# Patient Record
Sex: FEMALE | Race: WHITE | NOT HISPANIC OR LATINO
[De-identification: names, ages, dates, MRNs, and addresses within clinical notes are randomized per-mention and may not be internally consistent; named-entity substitution may affect disease eponyms.]

---

## 2019-09-27 ENCOUNTER — MEDICATION RENEWAL (OUTPATIENT)
Age: 64
End: 2019-09-27

## 2019-09-27 DIAGNOSIS — I10 ESSENTIAL (PRIMARY) HYPERTENSION: ICD-10-CM

## 2020-04-28 ENCOUNTER — EMERGENCY (EMERGENCY)
Facility: HOSPITAL | Age: 65
LOS: 1 days | Discharge: HOME | End: 2020-04-28
Attending: EMERGENCY MEDICINE | Admitting: EMERGENCY MEDICINE
Payer: MEDICARE

## 2020-04-28 VITALS
OXYGEN SATURATION: 93 % | SYSTOLIC BLOOD PRESSURE: 104 MMHG | TEMPERATURE: 100 F | DIASTOLIC BLOOD PRESSURE: 71 MMHG | WEIGHT: 199.96 LBS | RESPIRATION RATE: 18 BRPM | HEART RATE: 84 BPM

## 2020-04-28 PROCEDURE — 99283 EMERGENCY DEPT VISIT LOW MDM: CPT

## 2020-04-28 NOTE — ED PROVIDER NOTE - NSFOLLOWUPINSTRUCTIONS_ED_ALL_ED_FT
YOU WERE SEEN IN THE ED FOR A LIKELY VIRAL ILLNESS. WE CANNOT PERFORM DEFINITIVE TESTING AT THIS TIME FOR THE NOVEL CORONAVIRUS, HOWEVER IT IS VERY LIKELY THAT YOU HAVE COVID-19 AND TESTING IS NOT NECESSARY AT THIS TIME. PLEASE READ THE INFORMATION PACKET PROVIDED TO YOU CAREFULLY.     YOU SHOULD SELF-QUARANTINE FOR 14 DAYS TO AVOID POTENTIAL SPREAD OF THE CORONAVIRUS.     PLEASE CONTACT YOUR REGULAR DOCTOR OR CALL  [phone] after your symptoms are gone for 7 days to see if you can be tested to be sure you no longer have the coronavirus infection and when you can return to normal activity.    Return to the ED for difficulty breathing especially when you are at rest as this is a sign of severe pneumonia from the virus.    You may over the counter acetaminophen (Tylenol) 650mg every 6 hours as needed for fever or pain.     Do NOT exceed 3500mg acetaminophen in 24 hours.  Please do not take these medications if you do not have pain or fever or if you have any history of liver disease.    -------------    What is a coronavirus?  Coronaviruses are a large family of viruses that cause illnesses ranging from the common cold to more severe diseases such as Middle East Respiratory Syndrome (MERS) and Severe Acute Respiratory Syndrome (SARS).    What is Novel Coronavirus (COVID-19)?  The Centers for Disease Control and Prevention (CDC) is closely monitoring the outbreak caused by COVID-19. For the latest information about COVID-19, visit the CDC website at CDC.gov/Coronavirus    How are coronaviruses spread?  Coronaviruses can be transmitted from person to person, usually after close contact with an infected  person (for example, in a household, workplace, or healthcare setting), via droplets that become airborne after a cough or sneeze. These droplets can then infect a nearby person. Transmission can also occur by touching recently contaminated surfaces.    Is there a treatment for a COVID-19?  There is no specific treatment for disease caused by COVID-19. However, many of the symptoms can be treated based on the patient’s clinical condition. Supportive care for infected persons can be highly effective.    What are the symptoms of coronavirus infection?  It depends on the virus, but common signs include fever and/or respiratory symptoms such as cough and shortness of breath. In more severe cases, infection can cause pneumonia, severe acute respiratory syndrome, kidney failure and even death. Fortunately, most cases of COVID-19 have an illness no different than the influenza (flu), with a majority of these patients having mild symptoms and overall mortality which appears to be not much different than the flu.    What can I do to protect myself?  The best precautionary measures:  – washing your hands  – covering your cough  – disinfecting surfaces  – it is also advisable to avoid close contact with anyone showing symptoms of respiratory illness such as coughing and sneezing  – those with symptoms should wear a surgical mask when around others    What can I do to protect those around me?  If you have been identified as someone who may be infected with COVID-19, we recommend you follow the self-isolation procedures outlined on the following page to protect those around you and to limit the spread of this virus.    We recommend the below precautionary steps from now until 14 days from when you returned from your travel or date of your last known possible contact:    — Do not go to work, school or public areas. Avoid using public transportation, ridesharing or taxis.  — As much as possible, separate yourself from other people in your home. If you can, you should stay in a room and away from other people. Also, you should use a separate bathroom if available.  — Wear the supplied mask whenever you are around other people.  — If you have a non-urgent medical appointment, please reschedule for a later date. If the appointment is urgent, please call the health care provider and tell them that you are on self-isolation for possible COVID-19. This will help the health care provider’s office take steps to keep other people from getting infected or exposed. If you can reschedule routine appointments, do so.  — Wash your hands often with soap and water for at least 15 to 20 seconds or clean your hands with an alcohol-based hand  that contains 60 to 95% alcohol, covering all surfaces of your hands and rubbing them together until they feel dry. Soap and water should be used preferentially if hands are visibly dirty.  — Cover your mouth and nose with a tissue when you cough or sneeze. Throw used tissues in a lined trash can. Immediately wash your hands.  — Avoid touching your eyes, nose, and mouth with your hands.  — Avoid sharing personal household items. You should not share dishes, drinking glasses, cups, eating utensils, towels, or bedding with other people or pets in your home. After using these items, they should be washed thoroughly with soap and water.  — Clean and disinfect all “high-touch” surfaces every day. High touch surfaces include counters, tabletops, doorknobs, light switches, remote controls, bathroom fixtures, toilets, phones, keyboards, tablets, and bedside tables. Also, clean any surfaces that may have blood, stool, or body fluids on them.

## 2020-04-28 NOTE — ED PROVIDER NOTE - CLINICAL SUMMARY MEDICAL DECISION MAKING FREE TEXT BOX
h.o HTN, p.w fever and chill w.o shortness of breath and cough or diarrhea for 1 day. not tachypneic or hypoxic on RA in ER. will ambulatory sat and if no desat. will d/c home.  no n/v, LE edema or pain, no h.o bleeding or blood clots. h.o HTN, p.w fever and chill w.o shortness of breath and cough or diarrhea for 1 day. not tachypneic or hypoxic on RA in ER. will ambulatory sat and if no desat. will d/c home.  no n/v, LE edema or pain, no h.o bleeding or blood clots.  Jennifer: Patient with htn here with fever and chills with no respiratory distress or cough. patient is not tachypneic. not hypoxic. ambulatory saturation to 95% while in ER. observed for short time and will d/c home. given return precautions.   The patient does not have high-risk features of a life-threatening URI infection (COVID or otherwise).  There is no severe shortness of breath, light-headedness, or inability to perform that would indicated impending respiratory failure.  I have had a long conversation with the patient regarding the need for return to the ED:  worsening cough, shortness of breath, syncope, near-syncope, or any other concerns. COVID swabs were not sent.

## 2020-04-28 NOTE — ED PROVIDER NOTE - PATIENT PORTAL LINK FT
You can access the FollowMyHealth Patient Portal offered by [facility] by registering at the following website: [email or web address]. By joining [facility]’s FollowMyHealth portal, you will also be able to view your health information using other applications (apps) compatible with our system.

## 2020-04-28 NOTE — ED PROVIDER NOTE - OBJECTIVE STATEMENT
64F, h.o HTN, p.w fever and chill for 1 days and baseline healthy yesterday. Pt went to urgent care and was told to come to ER after hypoxic to high 80s. Denied chest pain, shortness of breath, n/v/d, urinary issues, melena and abn bleeding. no LE edema no h.o blood clot or fx of blood clots.

## 2020-04-28 NOTE — ED ADULT NURSE NOTE - OBJECTIVE STATEMENT
65 y/o female presents to ED from urgent care via EMS for flu-like symptoms including malaise and fever since this morning. Pt states yesterday she was in normal state of health, woke up this morning feeling tired, chills, and had a fever (between 99 and 102 - took tylenol at 3pm). Went to urgent care where they found her pulse ox after exertion to be high 80s%. Per EMS, their ambulatory pulse ox was 93% and she was able to ambulate to stretcher on her own. Upon arrival, pt A&O x 3. Ambulatory. Denies SOB, cough, respiratory discomfort, n/v/d. Resting sat was 96%, 93-94% after ambulation. Pt appears well after walking.

## 2020-04-29 ENCOUNTER — APPOINTMENT (OUTPATIENT)
Dept: PULMONOLOGY | Facility: CLINIC | Age: 65
End: 2020-04-29
Payer: MEDICARE

## 2020-04-29 ENCOUNTER — TRANSCRIPTION ENCOUNTER (OUTPATIENT)
Age: 65
End: 2020-04-29

## 2020-04-29 PROCEDURE — 99203 OFFICE O/P NEW LOW 30 MIN: CPT | Mod: 95

## 2020-04-29 NOTE — PLAN
[FreeTextEntry1] : Suspected COVID infection, Day 2.\par Feeling better, no fever already today.\par Pulse ox now 98%\par \par I advised pt to continue to monitor temps and pulse ox for 7 days.\par \par Let me know if any resp sx or lower O2 develop\par \par She will f/u results of covid swab from yesterday - \par though i advised her even if it is negative to isolate for 7 days

## 2020-04-29 NOTE — HISTORY OF PRESENT ILLNESS
[Spouse] : spouse [Home] : at home, [unfilled] , at the time of the visit. [FreeTextEntry1] : via [employer]\par \par 64 year old woman, h/o HTN, seen at Urgent Care yesterday, sent to ED for evaluation, treat and released.\par \par Patient was well until yesterday, developed sudden onset chills and rigors, followed by fever to 103\par No respiratory sx.\par Went to [facility] site in [place] \par COVID testing sent.\par Pt was noted to have a pulse ox in the low 90s on room air , was transferred to the ED for further evaluation. On arrival to ED, pulse ox was back up to 97%. Pt was released home\par \par Today, she is feeling much better. No more fever. Just some fatigue.\par She was given a pulse ox at urgent care, has been monitoring it - remains 98% today, with walking.\par \par Her  has moved to a different room, she is isolating

## 2023-06-29 NOTE — ED PROVIDER NOTE - GASTROINTESTINAL NEGATIVE STATEMENT, MLM
glasses/Partially impaired: cannot see medication labels or newsprint, but can see obstacles in path, and the surrounding layout; can count fingers at arm's length no abdominal pain, no bloating, no constipation, no diarrhea, no nausea and no vomiting.
